# Patient Record
Sex: MALE | Race: WHITE | NOT HISPANIC OR LATINO | URBAN - METROPOLITAN AREA
[De-identification: names, ages, dates, MRNs, and addresses within clinical notes are randomized per-mention and may not be internally consistent; named-entity substitution may affect disease eponyms.]

---

## 2017-05-02 ENCOUNTER — OFFICE VISIT - HEALTHEAST (OUTPATIENT)
Dept: FAMILY MEDICINE | Facility: CLINIC | Age: 27
End: 2017-05-02

## 2017-05-02 DIAGNOSIS — Z00.00 WELL ADULT EXAM: ICD-10-CM

## 2017-05-02 DIAGNOSIS — M20.22 HALLUX RIGIDUS OF LEFT FOOT: ICD-10-CM

## 2017-05-02 LAB
CHOLEST SERPL-MCNC: 151 MG/DL
FASTING STATUS PATIENT QL REPORTED: YES
HDLC SERPL-MCNC: 51 MG/DL
LDLC SERPL CALC-MCNC: 85 MG/DL
TRIGL SERPL-MCNC: 73 MG/DL

## 2017-05-02 ASSESSMENT — MIFFLIN-ST. JEOR: SCORE: 1646.19

## 2017-06-01 ENCOUNTER — OFFICE VISIT - HEALTHEAST (OUTPATIENT)
Dept: PODIATRY | Facility: CLINIC | Age: 27
End: 2017-06-01

## 2017-06-01 DIAGNOSIS — M20.5X2 HALLUX LIMITUS OF LEFT FOOT: ICD-10-CM

## 2017-10-09 ENCOUNTER — OFFICE VISIT - HEALTHEAST (OUTPATIENT)
Dept: FAMILY MEDICINE | Facility: CLINIC | Age: 27
End: 2017-10-09

## 2017-10-09 DIAGNOSIS — Z23 NEED FOR VACCINATION: ICD-10-CM

## 2017-10-09 DIAGNOSIS — M65.4 DE QUERVAIN'S DISEASE (TENOSYNOVITIS): ICD-10-CM

## 2017-10-09 ASSESSMENT — MIFFLIN-ST. JEOR: SCORE: 1675.72

## 2018-12-11 ENCOUNTER — OFFICE VISIT - HEALTHEAST (OUTPATIENT)
Dept: FAMILY MEDICINE | Facility: CLINIC | Age: 28
End: 2018-12-11

## 2018-12-11 DIAGNOSIS — E66.3 OVERWEIGHT: ICD-10-CM

## 2018-12-11 DIAGNOSIS — Z13.1 ENCOUNTER FOR SCREENING FOR DIABETES MELLITUS: ICD-10-CM

## 2018-12-11 DIAGNOSIS — F43.21 ADJUSTMENT DISORDER WITH DEPRESSED MOOD: ICD-10-CM

## 2018-12-11 DIAGNOSIS — Z13.220 ENCOUNTER FOR SCREENING FOR LIPOID DISORDERS: ICD-10-CM

## 2018-12-11 DIAGNOSIS — Z00.00 ENCOUNTER FOR GENERAL ADULT MEDICAL EXAMINATION WITHOUT ABNORMAL FINDINGS: ICD-10-CM

## 2018-12-11 DIAGNOSIS — Z23 NEED FOR VACCINATION: ICD-10-CM

## 2018-12-11 ASSESSMENT — MIFFLIN-ST. JEOR: SCORE: 1668.07

## 2018-12-18 ENCOUNTER — AMBULATORY - HEALTHEAST (OUTPATIENT)
Dept: LAB | Facility: CLINIC | Age: 28
End: 2018-12-18

## 2018-12-18 DIAGNOSIS — Z13.220 ENCOUNTER FOR SCREENING FOR LIPOID DISORDERS: ICD-10-CM

## 2018-12-18 DIAGNOSIS — Z13.1 ENCOUNTER FOR SCREENING FOR DIABETES MELLITUS: ICD-10-CM

## 2018-12-18 DIAGNOSIS — F43.21 ADJUSTMENT DISORDER WITH DEPRESSED MOOD: ICD-10-CM

## 2018-12-18 LAB
CHOLEST SERPL-MCNC: 162 MG/DL
FASTING STATUS PATIENT QL REPORTED: YES
FASTING STATUS PATIENT QL REPORTED: YES
GLUCOSE BLD-MCNC: 91 MG/DL (ref 70–99)
HDLC SERPL-MCNC: 57 MG/DL
LDLC SERPL CALC-MCNC: 89 MG/DL
TRIGL SERPL-MCNC: 81 MG/DL

## 2018-12-19 LAB
25(OH)D3 SERPL-MCNC: 27.8 NG/ML (ref 30–80)
25(OH)D3 SERPL-MCNC: 27.8 NG/ML (ref 30–80)

## 2018-12-21 LAB
SHBG SERPL-SCNC: 41 NMOL/L (ref 11–80)
TESTOST FREE SERPL-MCNC: 10.12 NG/DL (ref 4.7–24.4)
TESTOST SERPL-MCNC: 545 NG/DL (ref 240–950)

## 2019-06-28 ENCOUNTER — COMMUNICATION - HEALTHEAST (OUTPATIENT)
Dept: FAMILY MEDICINE | Facility: CLINIC | Age: 29
End: 2019-06-28

## 2019-07-03 ENCOUNTER — OFFICE VISIT - HEALTHEAST (OUTPATIENT)
Dept: FAMILY MEDICINE | Facility: CLINIC | Age: 29
End: 2019-07-03

## 2019-07-03 ENCOUNTER — RECORDS - HEALTHEAST (OUTPATIENT)
Dept: GENERAL RADIOLOGY | Facility: CLINIC | Age: 29
End: 2019-07-03

## 2019-07-03 DIAGNOSIS — M25.542 JOINT PAIN IN FINGERS OF BOTH HANDS: ICD-10-CM

## 2019-07-03 DIAGNOSIS — M25.541 PAIN IN JOINTS OF RIGHT HAND: ICD-10-CM

## 2019-07-03 DIAGNOSIS — M25.542 PAIN IN JOINTS OF LEFT HAND: ICD-10-CM

## 2019-07-03 DIAGNOSIS — M25.541 JOINT PAIN IN FINGERS OF BOTH HANDS: ICD-10-CM

## 2019-07-03 LAB
C REACTIVE PROTEIN LHE: <0.1 MG/DL (ref 0–0.8)
ERYTHROCYTE [SEDIMENTATION RATE] IN BLOOD BY WESTERGREN METHOD: 2 MM/HR (ref 0–15)
RHEUMATOID FACT SERPL-ACNC: <15 IU/ML (ref 0–30)

## 2019-07-03 ASSESSMENT — MIFFLIN-ST. JEOR: SCORE: 1649.31

## 2019-07-05 LAB — ANA SER QL: 0.1 U

## 2020-09-16 ENCOUNTER — OFFICE VISIT - HEALTHEAST (OUTPATIENT)
Dept: FAMILY MEDICINE | Facility: CLINIC | Age: 30
End: 2020-09-16

## 2020-09-16 DIAGNOSIS — Z13.220 SCREENING CHOLESTEROL LEVEL: ICD-10-CM

## 2020-09-16 DIAGNOSIS — Z00.00 ENCOUNTER FOR GENERAL ADULT MEDICAL EXAMINATION WITHOUT ABNORMAL FINDINGS: ICD-10-CM

## 2020-09-16 DIAGNOSIS — Z13.1 SCREENING FOR DIABETES MELLITUS: ICD-10-CM

## 2020-09-16 LAB
ANION GAP SERPL CALCULATED.3IONS-SCNC: 8 MMOL/L (ref 5–18)
BUN SERPL-MCNC: 18 MG/DL (ref 8–22)
CALCIUM SERPL-MCNC: 9.4 MG/DL (ref 8.5–10.5)
CHLORIDE BLD-SCNC: 106 MMOL/L (ref 98–107)
CHOLEST SERPL-MCNC: 153 MG/DL
CO2 SERPL-SCNC: 26 MMOL/L (ref 22–31)
CREAT SERPL-MCNC: 0.79 MG/DL (ref 0.7–1.3)
FASTING STATUS PATIENT QL REPORTED: YES
GFR SERPL CREATININE-BSD FRML MDRD: >60 ML/MIN/1.73M2
GLUCOSE BLD-MCNC: 91 MG/DL (ref 70–125)
HDLC SERPL-MCNC: 57 MG/DL
LDLC SERPL CALC-MCNC: 82 MG/DL
POTASSIUM BLD-SCNC: 4 MMOL/L (ref 3.5–5)
SODIUM SERPL-SCNC: 140 MMOL/L (ref 136–145)
TRIGL SERPL-MCNC: 70 MG/DL

## 2020-09-16 RX ORDER — MAGNESIUM CITRATE
SOLUTION, ORAL ORAL
Status: SHIPPED | COMMUNITY
Start: 2020-09-16

## 2020-09-16 ASSESSMENT — PATIENT HEALTH QUESTIONNAIRE - PHQ9: SUM OF ALL RESPONSES TO PHQ QUESTIONS 1-9: 2

## 2020-09-16 ASSESSMENT — MIFFLIN-ST. JEOR: SCORE: 1650.35

## 2020-10-23 ENCOUNTER — RECORDS - HEALTHEAST (OUTPATIENT)
Dept: ADMINISTRATIVE | Facility: OTHER | Age: 30
End: 2020-10-23

## 2020-10-23 ENCOUNTER — RECORDS - HEALTHEAST (OUTPATIENT)
Dept: LAB | Facility: CLINIC | Age: 30
End: 2020-10-23

## 2020-10-23 LAB
C REACTIVE PROTEIN LHE: <0.1 MG/DL (ref 0–0.8)
ERYTHROCYTE [DISTWIDTH] IN BLOOD BY AUTOMATED COUNT: 13.2 % (ref 11–14.5)
ERYTHROCYTE [SEDIMENTATION RATE] IN BLOOD BY WESTERGREN METHOD: 2 MM/HR (ref 0–15)
HCT VFR BLD AUTO: 39.3 % (ref 40–54)
HGB BLD-MCNC: 13.5 G/DL (ref 14–18)
MCH RBC QN AUTO: 31.8 PG (ref 27–34)
MCHC RBC AUTO-ENTMCNC: 34.4 G/DL (ref 32–36)
MCV RBC AUTO: 93 FL (ref 80–100)
PLATELET # BLD AUTO: 214 THOU/UL (ref 140–440)
PMV BLD AUTO: 10.8 FL (ref 8.5–12.5)
RBC # BLD AUTO: 4.25 MILL/UL (ref 4.4–6.2)
WBC: 7 THOU/UL (ref 4–11)

## 2020-10-26 LAB — LYME TOTAL ANTIBODY - HISTORICAL: 0.03 INDEX VALUE

## 2020-10-29 LAB
B BURGDOR DNA SPEC QL NAA+PROBE: NOT DETECTED
SPECIMEN SOURCE: NORMAL

## 2020-11-02 ENCOUNTER — OFFICE VISIT - HEALTHEAST (OUTPATIENT)
Dept: RHEUMATOLOGY | Facility: CLINIC | Age: 30
End: 2020-11-02

## 2020-11-02 ENCOUNTER — COMMUNICATION - HEALTHEAST (OUTPATIENT)
Dept: RHEUMATOLOGY | Facility: CLINIC | Age: 30
End: 2020-11-02

## 2020-11-02 DIAGNOSIS — M25.50 POLYARTHRALGIA: ICD-10-CM

## 2020-11-02 RX ORDER — ACETAMINOPHEN 500 MG
500 TABLET ORAL EVERY 6 HOURS PRN
Status: SHIPPED | COMMUNITY
Start: 2020-11-02

## 2020-11-03 ENCOUNTER — AMBULATORY - HEALTHEAST (OUTPATIENT)
Dept: LAB | Facility: CLINIC | Age: 30
End: 2020-11-03

## 2020-11-03 DIAGNOSIS — M25.50 POLYARTHRALGIA: ICD-10-CM

## 2020-11-03 LAB
CCP AB SER IA-ACNC: <0.5 U/ML
URATE SERPL-MCNC: 5 MG/DL (ref 3–8)

## 2020-11-04 LAB — HCV AB SERPL QL IA: NEGATIVE

## 2020-11-05 LAB
B19V IGG SER IA-ACNC: 6.58 IV
B19V IGM SER IA-ACNC: 0.08 IV

## 2020-11-06 ENCOUNTER — OFFICE VISIT - HEALTHEAST (OUTPATIENT)
Dept: RHEUMATOLOGY | Facility: CLINIC | Age: 30
End: 2020-11-06

## 2020-11-06 DIAGNOSIS — M25.50 POLYARTHRALGIA: ICD-10-CM

## 2020-11-16 ENCOUNTER — COMMUNICATION - HEALTHEAST (OUTPATIENT)
Dept: FAMILY MEDICINE | Facility: CLINIC | Age: 30
End: 2020-11-16

## 2020-11-17 ENCOUNTER — AMBULATORY - HEALTHEAST (OUTPATIENT)
Dept: UROLOGY | Facility: CLINIC | Age: 30
End: 2020-11-17

## 2020-11-17 ENCOUNTER — AMBULATORY - HEALTHEAST (OUTPATIENT)
Dept: FAMILY MEDICINE | Facility: CLINIC | Age: 30
End: 2020-11-17

## 2020-11-17 DIAGNOSIS — N20.0 KIDNEY STONE: ICD-10-CM

## 2020-11-17 DIAGNOSIS — N20.1 CALCULUS OF URETER: ICD-10-CM

## 2020-11-19 ENCOUNTER — AMBULATORY - HEALTHEAST (OUTPATIENT)
Dept: LAB | Facility: HOSPITAL | Age: 30
End: 2020-11-19

## 2020-11-19 DIAGNOSIS — N20.1 CALCULUS OF URETER: ICD-10-CM

## 2020-11-22 LAB
APPEARANCE STONE: NORMAL
COMPN STONE: NORMAL
NUMBER STONE: 1
SIZE STONE: NORMAL MM
SPECIMEN WT: 7 MG

## 2020-11-25 ENCOUNTER — OFFICE VISIT - HEALTHEAST (OUTPATIENT)
Dept: UROLOGY | Facility: CLINIC | Age: 30
End: 2020-11-25

## 2020-11-25 DIAGNOSIS — N21.8 CALCULUS OF OTHER LOWER URINARY TRACT LOCATION: ICD-10-CM

## 2020-12-08 ENCOUNTER — COMMUNICATION - HEALTHEAST (OUTPATIENT)
Dept: UROLOGY | Facility: CLINIC | Age: 30
End: 2020-12-08

## 2020-12-09 ENCOUNTER — RECORDS - HEALTHEAST (OUTPATIENT)
Dept: ADMINISTRATIVE | Facility: OTHER | Age: 30
End: 2020-12-09

## 2020-12-22 ENCOUNTER — COMMUNICATION - HEALTHEAST (OUTPATIENT)
Dept: UROLOGY | Facility: CLINIC | Age: 30
End: 2020-12-22

## 2020-12-28 ENCOUNTER — HOSPITAL ENCOUNTER (OUTPATIENT)
Dept: CT IMAGING | Facility: HOSPITAL | Age: 30
Discharge: HOME | End: 2020-12-28
Attending: PHYSICIAN ASSISTANT

## 2020-12-28 DIAGNOSIS — N21.8 CALCULUS OF OTHER LOWER URINARY TRACT LOCATION: ICD-10-CM

## 2020-12-29 ENCOUNTER — OFFICE VISIT - HEALTHEAST (OUTPATIENT)
Dept: UROLOGY | Facility: CLINIC | Age: 30
End: 2020-12-29

## 2020-12-29 DIAGNOSIS — R82.992 HYPEROXALURIA: ICD-10-CM

## 2020-12-29 DIAGNOSIS — R82.991 HYPOCITRATURIA: ICD-10-CM

## 2020-12-29 DIAGNOSIS — R82.994 HYPERCALCIURIA: ICD-10-CM

## 2020-12-29 DIAGNOSIS — N20.0 CALCULUS OF KIDNEY: ICD-10-CM

## 2020-12-29 DIAGNOSIS — R82.998 HIGH URINE SODIUM: ICD-10-CM

## 2020-12-29 DIAGNOSIS — Z84.1 FAMILY HISTORY OF KIDNEY STONES: ICD-10-CM

## 2021-05-27 VITALS — HEART RATE: 68 BPM | SYSTOLIC BLOOD PRESSURE: 110 MMHG | DIASTOLIC BLOOD PRESSURE: 64 MMHG

## 2021-05-27 ASSESSMENT — PATIENT HEALTH QUESTIONNAIRE - PHQ9: SUM OF ALL RESPONSES TO PHQ QUESTIONS 1-9: 2

## 2021-05-30 VITALS — BODY MASS INDEX: 25.35 KG/M2 | WEIGHT: 161.5 LBS | HEIGHT: 67 IN

## 2021-05-30 NOTE — PROGRESS NOTES
Assessment / Impression     1. Joint pain in fingers of both hands  C-Reactive Protein    Antinuclear Antibody (ABHINAV) Cascade    Rheumatoid Factor Quant    Erythrocyte Sedimentation Rate    XR Hands Bilateral 2 VWS         Plan:     I recommended that we check x-rays of the hands today.  I personally reviewed the images with him and provided an initial interpretation.  No fractures or dislocation seen.  The joint spaces appear intact and well preserved.  Formal radiology report is pending.  I recommended that we check the above labs and he will be notified of the results when they are available.  If the lab results are negative I explained that his symptoms are likely due to overuse and suggested rest.  He may take Tylenol or ibuprofen as needed.    Subjective:      HPI: Kehinde Muhammad is a 28 y.o. male who presents to the clinic complaining of joint pains in both hands that he has had over the past month.  Triggers include gripping and lifting his young son.  He does a lot of of keyboarding and mouse work for his job as a .  He denies any visual swelling in the joints, but his angers feel full at times.  Symptoms are worse in the third and fourth fingers at the MCP and PIP joints.  He denies any specific injuries.  He denies having history of fractures or dislocations that involve the fingers.  He reports that his father started having hand arthritis symptoms when he was in his 30s.        Review of Systems  All other systems reviewed and are negative.     Social History     Tobacco Use   Smoking Status Never Smoker   Smokeless Tobacco Never Used       Family History   Problem Relation Age of Onset     Obesity Mother      Depression Mother      Diabetes Father      Obesity Father      Arthritis Father      Depression Sister        Objective:     /70 (Patient Site: Left Arm, Patient Position: Sitting, Cuff Size: Adult Regular)   Pulse (!) 52   Temp 98.6  F (37  C) (Oral)   Resp 16   Ht 5'  "7\" (1.702 m)   Wt 162 lb 3 oz (73.6 kg)   BMI 25.40 kg/m    Physical Examination: General appearance - alert, well appearing, and in no distress  Neurological: alert, oriented, normal speech, no focal findings or movement disorder noted.    Extremities: No edema, no clubbing or cyanosis.  Hands/fingers appear symmetric and not inflamed.  No bony tenderness throughout the hands and fingers.  He is able to flex and extend without difficulty.   strength is normal bilaterally.  Radial and ulnar pulses 2 out of 4 bilaterally.      No results found for this or any previous visit (from the past 168 hour(s)).    No current outpatient medications on file.     "

## 2021-05-31 VITALS — HEIGHT: 67 IN | WEIGHT: 169.06 LBS | BODY MASS INDEX: 26.53 KG/M2

## 2021-06-02 VITALS — BODY MASS INDEX: 26.27 KG/M2 | WEIGHT: 167.38 LBS | HEIGHT: 67 IN

## 2021-06-03 VITALS — BODY MASS INDEX: 25.46 KG/M2 | WEIGHT: 162.19 LBS | HEIGHT: 67 IN

## 2021-06-05 VITALS
HEART RATE: 56 BPM | WEIGHT: 161.7 LBS | DIASTOLIC BLOOD PRESSURE: 68 MMHG | RESPIRATION RATE: 14 BRPM | HEIGHT: 67 IN | BODY MASS INDEX: 25.38 KG/M2 | SYSTOLIC BLOOD PRESSURE: 120 MMHG

## 2021-06-10 NOTE — PROGRESS NOTES
Assessment:      Healthy male exam.    1. Well adult exam  Lipid Cascade    Glucose   2. Hallux rigidus of left foot  Ambulatory referral to Podiatry     The following high BMI interventions were performed this visit: encouragement to exercise     Plan:      Encouraged him to eat a healthy diet and get regular exercise.  We are going to check the above fasting labs and he will be notified of the results when they are available.  He was given a referral to podiatry to have the left great toe evaluated.     All questions answered.  Discussed healthy lifestyle modifications.  Follow up as needed.     Subjective:      Kehinde Muhammad is a 26 y.o. male who presents for an annual exam. The patient reports that there is not domestic violence in his life.  He denies having concerns regarding hearing, vision, urination, bowel movements, sleep or mood.  He describes having a history of left-sided hallux rigidus and underwent surgery in 2013.  He reports having worsening pain and some decreased range of motion in the left great toe.  His symptoms are worse with overuse.  He denies swelling or erythema.    Social history: , his wife is pregnant with her first child and due in July.  He works as a .    Healthy Habits:   Regular Exercise: Yes  Sunscreen Use: Yes  Healthy Diet: Yes  Dental Visits Regularly: Yes  Seat Belt: Yes  Sexually active: Yes  Monthly Self Testicular Exams:  No  Hemoccults: No  Flex Sig: No  Colonoscopy: No  Lipid Profile: Yes 2015  Glucose Screen: Yes 2015  Prevention of Osteoporosis: No  Last Dexa: N/A  Guns at Home:  N/A      Immunization History   Administered Date(s) Administered     Influenza, inj, historic 10/03/2013, 10/15/2015     Tdap 11/11/2015     Immunization status: up to date and documented.    No exam data present    No current outpatient prescriptions on file.     No current facility-administered medications for this visit.      No past medical history on file.  No  "past surgical history on file.  Review of patient's allergies indicates no known allergies.  Family History   Problem Relation Age of Onset     Obesity Mother      Depression Mother      Diabetes Father      Obesity Father      Depression Sister      Social History     Social History     Marital status: Single     Spouse name: N/A     Number of children: N/A     Years of education: N/A     Occupational History     Not on file.     Social History Main Topics     Smoking status: Never Smoker     Smokeless tobacco: Never Used     Alcohol use Yes      Comment: 2-4 drinks a week.     Drug use: No     Sexual activity: Not on file     Other Topics Concern     Not on file     Social History Narrative       Review of Systems  General:  Denies problem  Eyes: Denies problem  Ears/Nose/Throat: Denies problem  Cardiovascular: Denies problem  Respiratory:  Denies problem  Gastrointestinal:  Denies problem  Genitourinary: Denies problem  Musculoskeletal:  Denies problem  Skin: Denies problem  Neurologic: Denies problem  Psychiatric: Denies problem  Endocrine: Denies problem  Heme/Lymphatic: Denies problem   Allergic/Immunologic: Denies problem        Objective:     Vitals:    05/02/17 0805   BP: 110/66   Pulse: (!) 56   Resp: 16   Temp: 98.4  F (36.9  C)   TempSrc: Oral   Weight: 161 lb 8 oz (73.3 kg)   Height: 5' 7\" (1.702 m)     Body mass index is 25.29 kg/(m^2).    Physical  General Appearance: Alert, cooperative, no distress, appears stated age  Head: Normocephalic, without obvious abnormality, atraumatic  Eyes: PERRL, conjunctiva/corneas clear, EOM's intact  Ears: Normal TM's and external ear canals, both ears  Nose: Nares normal, septum midline,mucosa normal, no drainage  Throat: Lips, mucosa, and tongue normal; teeth and gums normal  Neck: Supple, symmetrical, trachea midline, no adenopathy;  thyroid: not enlarged, symmetric, no tenderness/mass/nodules  Back: Symmetric, no curvature, ROM normal, no CVA tenderness  Lungs: " Clear to auscultation bilaterally, respirations unlabored  Heart: Regular rate and rhythm, S1 and S2 normal, no murmur, rub, or gallop,  Abdomen: Soft, non-tender, bowel sounds active all four quadrants,  no masses, no organomegaly  Genitourinary: Penis normal. Right testis is descended. Left testis is descended.  no hernias palpated  Musculoskeletal: Normal range of motion. No joint swelling or deformity.   Extremities: Extremities normal, atraumatic, no cyanosis or edema.  Passive range of motion in the left great toe is good.  Extension of the great toe causes discomfort.  There is no bony tenderness in the feet.  Skin: Skin color, texture, turgor normal, no rashes or lesions  Lymph nodes: Cervical, supraclavicular, and axillary nodes normal  Neurologic: He is alert. He has normal reflexes.   Psychiatric: He has a normal mood and affect.

## 2021-06-11 NOTE — PROGRESS NOTES
Admission History & Physical  Kehinde Muhammad, 1990, [id number]    [facility] Bello Taylor DO, [phone]    Extended Emergency Contact Information  Primary Emergency Contact: Melissa Muhammad  Address: [address]           [place] [address] [place]  Home Phone: [phone]  Mobile Phone: [phone]  Relation: Spouse     Assessment and Plan:   Assessment: Hallux limitus left foot  Plan: I recommended ice and elevation and rest when he has episodes of pain.  I do not recommend any surgery at this time.  Active Problems:    * No active hospital problems. *      Chief Complaint:  painful first metatarsal phalangeal joint left foot      HPI:    Kehinde Muhammad is a 26 y.o. old male who presented to the clinic today complaining of pain involving the big toe joint left foot.  The patient stated that 4 years ago he had a cheilectomy performed.  He is able to remain active but he does have some moderate pain after increased activity.  He has since switched from running to cycling.  He has an aching pain in the big toe joint left foot.  He does not have any associated redness or swelling with his symptoms.  The pain is relieved with nonweightbearing and rest.  History is provided by patient    Medical History  Active Ambulatory (Non-Hospital) Problems    Diagnosis     Overweight     Joint Pain, Localized In The Shoulder     History reviewed. No pertinent past medical history.  Patient Active Problem List    Diagnosis Date Noted     Overweight      Joint Pain, Localized In The Shoulder      Surgical History  He  has no past surgical history on file.   History reviewed. No pertinent surgical history. Social History  Reviewed, and he  reports that he has never smoked. He has never used smokeless tobacco. He reports that he drinks alcohol. He reports that he does not use illicit drugs.  Social History   Substance Use Topics     Smoking status: Never Smoker      Smokeless tobacco: Never Used     Alcohol use Yes      Comment: 2-4 drinks a week.      Allergies  No Known Allergies Family History  Reviewed, and family history includes Depression in his mother and sister; Diabetes in his father; Obesity in his father and mother.   Psychosocial Needs  Social History     Social History Narrative     Additional psychosocial needs reviewed per nursing assessment.       Prior to Admission Medications     (Not in a hospital admission)        Review of Systems - Negative     /58  Pulse 60  SpO2 98%    Objective findings: General: The patient is alert and in no acute distress      Integument: Nails bilateral feet are normal length and color. Skin bilateral feet warm and intact.  There is a well-healed surgical incision on the dorsal aspect of the first metatarsal phalangeal joint left foot.      Vascular: DP and PT pulses +2/4 bilateral feet.  Capillary refill less than 2 seconds bilateral feet.      Neurologic: Negative clonus, negative Babinski bilateral feet.       Musculoskeletal: Range of motion within normal limits bilaterally.  Muscle power +5/5 bilaterally in all compartments.  There is a large firm subcutaneous mass on the dorsal aspect of the head of the first metatarsal bilateral feet.      There is good range of motion at the first MPJ bilaterally when the great toe is maximally dorsiflexed.  There is a slight decrease in the amount of plantarflexion available at the first MPJ left foot.  There is no crepitus on range of motion of the first MPJ left foot.  There is no pain on range of motion of the first MPJ left foot.        Assessment: Stage I hallux limitus left foot        Plan: An x-ray of the left foot was taken today.  I informed the patient that the joint is in fairly good condition at this time.  I do not recommend any aggressive treatment.  I certainly do not recommend any additional surgery.  The first metatarsophalangeal joint is in a rectus position and  there are very mild degenerative changes at the joint.  I recommended the patient continue to remain active.  If he has periods of pain and swelling I recommended anti-inflammatory medication rest and ice.  In the future he will more than likely have to have this surgically repaired and I would probably recommend at that time a first metatarsal phalangeal joint implant arthroplasty.

## 2021-06-12 NOTE — PROGRESS NOTES
ASSESSMENT AND PLAN:  Kehinde Muhammad 30 y.o. male is seen here on 11/06/20 for follow-up of recent evaluation for polyarthralgias, with insufficient evidence to rule in or rule out an inflammatory joint disease, we will get together here should there be recurrence, will take pictures in case of swollen joint.  Parvo IgG antibodies were positive, IgM negative.  There are no serologic signals of autoimmunity.     Diagnoses and all orders for this visit:    Polyarthralgia      HISTORY OF PRESENTING ILLNESS:  Kehinde Muhammad, 30 y.o., male is here for follow-up of recent evaluation of polyarthralgias.  This is first troubled him 12 to 18 months ago.  This affects his digits, this is bilateral, typically worse in the morning and in the afternoon, he has not observed swelling.  He has taken Tylenol which has helped some but he wonder s if tart cherry products have helped him more.  He was originally seen in his primary physician's office.  Work-up was negative for rheumatoid factor, ABHINAV Lyme serology, had x-rays of the hands.  No definitive diagnosis was arrived at.  His symptoms improved spontaneously.  But the summer 2020 there was recurrence.  Similar involvement.  This time he was seen in hand specialist office at [facility] repeat x-rays and repeat blood test, pain at that time was 7-8/10, again inconclusive, suggested rheumatology evaluation, meanwhile he went back to the tar cherry juice, that is he feels helped his pain is down to 2/10.  Once again no swelling redness.  He has noted no rash such as psoriasis ulcerative colitis Crohn's disease himself or the family.  He has noted no other joint areas are painful including wrists elbows shoulders knees hips ankles or toes.  There has been no suggestion of dactylitis.  There is no history of iritis.  He describes himself otherwise in good health.  He is a 2-year-old boy, and toddler.  They have not had symptoms suggestive of fifth disease.  There  is no family history of rheumatoid arthritis or lupus. ALLERGIES:Patient has no known allergies.    PAST MEDICAL/ACTIVE PROBLEMS/MEDICATION/ FAMILY HISTORY/SOCIAL DATA:  The patient has a family history of  History reviewed. No pertinent past medical history.  Social History     Tobacco Use   Smoking Status Never Smoker   Smokeless Tobacco Never Used     Patient Active Problem List   Diagnosis     Overweight     Current Outpatient Medications   Medication Sig Dispense Refill     acetaminophen (TYLENOL) 500 MG tablet Take 500 mg by mouth every 6 (six) hours as needed for pain.       cholecalciferol, vitamin D3, (VITAMIN D3 ORAL) 5,000 mg.       magnesium citrate solution        multivit with min-folic acid (MEN'S MULTIVITAMIN GUMMIES) 200 mcg Chew        No current facility-administered medications for this visit.      DETAILED EXAMINATION  11/06/20  :  Vitals:    11/06/20 1035   BP: 110/64   Patient Site: Right Arm   Patient Position: Sitting   Cuff Size: Adult Regular   Pulse: 68     Alert oriented. Head including the face is examined for malar rash, heliotropes, scarring, lupus pernio. Eyes examined for redness such as in episcleritis/scleritis, periorbital lesions.   Neck/ Face examined for parotid gland swelling, range of motion of neck.  Left upper and lower and right upper and lower extremities examined for tenderness, swelling, warmth of the appendicular joints, range of motion, edema, rash.  Some of the important findings included: he does not have synovitis in any of the palpable joints of upper extremities, there is no dactylitis, there is no triggering of the digits.      LAB / IMAGING DATA:  Creatinine   Date Value Ref Range Status   09/16/2020 0.79 0.70 - 1.30 mg/dL Final   11/11/2015 0.80 0.70 - 1.30 mg/dL Final       WBC   Date Value Ref Range Status   10/23/2020 7.0 4.0 - 11.0 thou/uL Final     Hemoglobin   Date Value Ref Range Status   10/23/2020 13.5 (L) 14.0 - 18.0 g/dL Final     Platelets   Date  Value Ref Range Status   10/23/2020 214 140 - 440 thou/uL Final       Lab Results   Component Value Date    RF <15.0 07/03/2019    SEDRATE 2 10/23/2020

## 2021-06-12 NOTE — PROGRESS NOTES
"Kehinde Muhammad is a 30 y.o. male who is being evaluated via a billable video visit.      The patient has been notified of following:     \"This video visit will be conducted via a call between you and your physician/provider. We have found that certain health care needs can be provided without the need for an in-person physical exam.  This service lets us provide the care you need with a video conversation.  If a prescription is necessary we can send it directly to your pharmacy.  If lab work is needed we can place an order for that and you can then stop by our lab to have the test done at a later time.    Video visits are billed at different rates depending on your insurance coverage. Please reach out to your insurance provider with any questions.    If during the course of the call the physician/provider feels a video visit is not appropriate, you will not be charged for this service.\"    Patient has given verbal consent to a Video visit? Yes  How would you like to obtain your AVS? AVS Preference: [employer].  If dropped by the video visit, the video invitation should be sent to: Text to cell phone: [employer]   Will anyone else be joining your video visit? No        Video-Visit Details    Type of service:  Video Visit    Originating Location (pt. Location): Home    Distant Location (provider location):  [facility]     Platform used for Video Visit: [employer]    Start: 11/02/2020 09:44 am   Stop: 11/02/2020 09:56 am    ASSESSMENT AND PLAN:  Kehinde Muhammad 30 y.o. male is seen here on 11/02/20 for evaluation of polyarthralgias intermittent over the past 12 to 18 months, predominantly affecting PIPs and MCPs of both hands, negative ABHINAV rheumatoid factor, acute phase response negative, recurrence recently, will look into this further work-up as noted will get together in the next near future further course to be charted accordingly.  Diagnoses and all orders for this visit:    Polyarthralgia  -     " CCP Antibodies; Future; Expected date: 11/04/2020  -     Hepatitis C Antibody (Anti-HCV); Future; Expected date: 11/04/2020  -     Uric Acid; Future; Expected date: 11/04/2020  -     Parvovirus B19 Antibodies, IgG and IgM; Future; Expected date: 11/04/2020      HISTORY OF PRESENTING ILLNESS:  Kehinde Muhammad, 30 y.o., male is here for evaluation of polyarthralgias.  This is first troubled him 12 to 18 months ago.  This affects his digits, this is bilateral, typically worse in the morning and in the afternoon, he has not observed swelling.  He has taken Tylenol which has helped some but he wonder s if tart cherry products have helped him more.  He was originally seen in his primary physician's office.  Work-up was negative for rheumatoid factor, ABHINAV Lyme serology, had x-rays of the hands.  No definitive diagnosis was arrived at.  His symptoms improved spontaneously.  But the summer 2020 there was recurrence.  Similar involvement.  This time he was seen in hand specialist office at [facility] repeat x-rays and repeat blood test, pain at that time was 7-8/10, again inconclusive, suggested rheumatology evaluation, meanwhile he went back to the tar cherry juice, that is he feels helped his pain is down to 2/10.  Once again no swelling redness.  He has noted no rash such as psoriasis ulcerative colitis Crohn's disease himself or the family.  He has noted no other joint areas are painful including wrists elbows shoulders knees hips ankles or toes.  There has been no suggestion of dactylitis.  There is no history of iritis.  He describes himself otherwise in good health.  He is a 2-year-old boy, and toddler.  They have not had symptoms suggestive of fifth disease.  There is no family history of rheumatoid arthritis or lupus.    Further historical information and ADL limitations as noted in the multidimensional health assessment questionnaire attached in the EMR. Rest of the 13 system ROS is negative.      ALLERGIES:Patient has no known allergies.    PAST MEDICAL/ACTIVE PROBLEMS/MEDICATION/ FAMILY HISTORY/SOCIAL DATA:  The patient has a family history of  No past medical history on file.  Social History     Tobacco Use   Smoking Status Never Smoker   Smokeless Tobacco Never Used     Patient Active Problem List   Diagnosis     Overweight     Current Outpatient Medications   Medication Sig Dispense Refill     acetaminophen (TYLENOL) 500 MG tablet Take 500 mg by mouth every 6 (six) hours as needed for pain.       cholecalciferol, vitamin D3, (VITAMIN D3 ORAL) 5,000 mg.       magnesium citrate solution        multivit with min-folic acid (MEN'S MULTIVITAMIN GUMMIES) 200 mcg Chew        No current facility-administered medications for this visit.        COMPREHENSIVE EXAMINATION:  There were no vitals filed for this visit.  A well appearing alert oriented male.   Well appearing alert oriented.   Examination of the eyes revealed no redness, obvious scleromalacia.  ENT examination shows no nasal deformity such as of saddle type, external ear without signs of inflamm/deformity ation.  Cardiopulmonary examination without obvious signs of dyspnea, no wheezing is audible, no cyanosis.  There is no finger clubbing.  Skin examination performed for heliotrope, malar area eruption periungual erythema, lupus pernio.  Neurological examination shows the speech is fluent, no facial asymmetry, muscle power in the upper extremities proximally appears to be normal.  In the psychiatric examination the memory, orientation, attention, affect were observable and normal.  Joint examination of the DIPs, PIPs, MCPs, IP joints of the thumbs, wrists, elbows, for swelling, range of motion, for shoulders range of motion evaluated.  The salient  findings are: There is no swelling, dactylitis of the digits, he is able to fully flex them into fist abduction of the shoulder is normal elbow wrist range of motion is normal no Maller area  eruption.    LAB / IMAGING DATA:  Creatinine   Date Value Ref Range Status   09/16/2020 0.79 0.70 - 1.30 mg/dL Final   11/11/2015 0.80 0.70 - 1.30 mg/dL Final       WBC   Date Value Ref Range Status   10/23/2020 7.0 4.0 - 11.0 thou/uL Final     Hemoglobin   Date Value Ref Range Status   10/23/2020 13.5 (L) 14.0 - 18.0 g/dL Final     Platelets   Date Value Ref Range Status   10/23/2020 214 140 - 440 thou/uL Final       Lab Results   Component Value Date    RF <15.0 07/03/2019    SEDRATE 2 10/23/2020

## 2021-06-12 NOTE — TELEPHONE ENCOUNTER
Per Dr. Steele,   pls schedule this pt on this coming Fri 11/6/2020 @ 0945 as OV. Notified scheduling pool team thru IM as well.   Thank you     Jaylene Pride CMA [facility] 11/2/2020 11:32 AM

## 2021-06-12 NOTE — PROGRESS NOTES
1st attempt   LVMT[facility]- to go through rooming questions. Will try again later     Jaylene Pride CMA [facility] 11/2/2020 8:16 AM

## 2021-06-13 NOTE — PROGRESS NOTES
"Assessment / Impression     1. De Quervain's disease (tenosynovitis)  Splint, Wrist/Hand/Finger w/Thumb, Premier   2. Need for vaccination  Influenza, Seasonal,Quad Inj, 36+ MOS         Plan:     I explained that his symptoms are consistent with de Quervain's tenosynovitis on the right wrist.  We discussed activity modifications to help with this overuse injury.  He was given a thumb spica splint and a handout that includes exercises he can do at home.  I demonstrated an eccentric exercise that may provide benefit as well.  He may take Tylenol or ibuprofen as needed.  The symptoms do not improve he may return to the clinic and we can discuss alternative treatment options which may include a corticosteroid injection.    Subjective:      HPI: Kehinde Muhammad is a 27 y.o. male who presents to the clinic complaining of pain with stiffness and some swelling in the right wrist proximal to the thumb over the past 10 days.  He denies any known injury.  He has a 3-month-old baby boy at home.  Lifting him makes his wrist symptoms worse.        Review of Systems  All other systems reviewed and are negative.     History   Smoking Status     Never Smoker   Smokeless Tobacco     Never Used       Family History   Problem Relation Age of Onset     Obesity Mother      Depression Mother      Diabetes Father      Obesity Father      Depression Sister        Objective:     /72 (Patient Site: Left Arm, Patient Position: Sitting, Cuff Size: Adult Regular)  Pulse 68  Temp 98.6  F (37  C) (Oral)   Resp 16  Ht 5' 6.7\" (1.694 m)  Wt 169 lb 1 oz (76.7 kg)  BMI 26.72 kg/m2  Physical Examination: General appearance - alert, well appearing, and in no distress  Neurological: alert, oriented, normal speech, no focal findings or movement disorder noted.    Extremities: No edema, bruising, clubbing or cyanosis.  He is tender over the soft tissue in the right lateral wrist, proximal to the thumb.  Finkelstein's test is positive on the " right.  Radial and ulnar pulses 2 out of 4 bilaterally.  There is no bony tenderness in his hand, wrist or elbow.    No results found for this or any previous visit (from the past 168 hour(s)).    No current outpatient prescriptions on file.

## 2021-06-13 NOTE — PROGRESS NOTES
"Assessment/Plan:        Diagnoses and all orders for this visit:    Calculus of other lower urinary tract location  -     Patient Stated Goal: Prevent further stones  -     24 Hour Urine Collection Steps Education  -     CT Abdomen Pelvis Without Oral Without IV Contrast; Future; Expected date: 12/09/2020    Other orders  -     sour cherry extract (TART CHERRY EXTRACT) 1,000 mg cap      Stone Management Plan  [facility] Stone Management 11/25/2020   Urinary Tract Infection No suspicion of infection   Renal Colic Asymptomatic at this time   Renal Failure No suspicion of renal failure           Phone call duration: 8 minutes    Lisa Alba PA-C    Subjective:      The patient has been notified of following:     \"This telephone visit will be conducted via a call between you and your physician/provider. We have found that certain health care needs can be provided without the need for a physical exam.  This service lets us provide the care you need with a short phone conversation. If a prescription is necessary, we can send it directly to your pharmacy.  If labs and/or imaging are needed, we can place orders so that you can have the test (s) done at a later time.    If during the course of the call the physician/provider feels a telephone visit is not appropriate, you will not be charged for this service.\"     HPI  Mr. Kehined Muhammad is a 30 y.o.  male who is being evaluated via a billable telephone visit by [facility] [place] referred by PCP for recent stone passage event.    He is a first time calcium oxalate stone former who has not required stone clearance procedures. He has not previously participated in stone risk evaluation. He has no identified modifiable stone risk factors. He has identified non-modifiable stone risks including:  limited family history.    Approximately ~ 1 week ago, he awoke with urethral pain and within a couple hours, passed a 2-3 mm stone. He called his PCP, who had " the stone analyzed and sent referral to our office. He has been asymptomatic since passing the stone. He denies symptoms of fever, chills, flank pain, nausea, vomiting, urinary frequency and dysuria.     No imaging available.    Significant labs from presentation include uric acid 5. Stone analysis from 11/19/20 is primarily CaOx.    PLAN    31 yo M first time stone former with recent passage of a CaOx stone. Familial history of stone disease.    Will initiate comprehensive stone risk evaluation. Two 24 hour urine collections and dietary journal will be obtained at earliest covenience. Patient verbalized understanding. Patient agrees with plan as discussed. He will return to clinic when labs are available with a baseline CT abdomen/pelvis to evaluate if any residual stone burden.         ROS   A 12 point comprehensive review of systems is negative except for HPI    No past medical history on file.    Past Surgical History:   Procedure Laterality Date     left foot surg Left        Current Outpatient Medications   Medication Sig Dispense Refill     acetaminophen (TYLENOL) 500 MG tablet Take 500 mg by mouth every 6 (six) hours as needed for pain.       cholecalciferol, vitamin D3, (VITAMIN D3 ORAL) 5,000 mg.       magnesium citrate solution        multivit with min-folic acid (MEN'S MULTIVITAMIN GUMMIES) 200 mcg Chew        sour cherry extract (TART CHERRY EXTRACT) 1,000 mg cap        No current facility-administered medications for this visit.        No Known Allergies    Social History     Socioeconomic History     Marital status:      Spouse name: Not on file     Number of children: Not on file     Years of education: Not on file     Highest education level: Not on file   Occupational History     Not on file   Social Needs     Financial resource strain: Not on file     Food insecurity     Worry: Not on file     Inability: Not on file     Transportation needs     Medical: Not on file     Non-medical: Not on  file   Tobacco Use     Smoking status: Never Smoker     Smokeless tobacco: Never Used   Substance and Sexual Activity     Alcohol use: Yes     Frequency: 2-3 times a week     Comment: 2-4 drinks a week.     Drug use: No     Sexual activity: Not on file   Lifestyle     Physical activity     Days per week: Not on file     Minutes per session: Not on file     Stress: Not on file   Relationships     Social connections     Talks on phone: Not on file     Gets together: Not on file     Attends Latter day service: Not on file     Active member of club or organization: Not on file     Attends meetings of clubs or organizations: Not on file     Relationship status: Not on file     Intimate partner violence     Fear of current or ex partner: Not on file     Emotionally abused: Not on file     Physically abused: Not on file     Forced sexual activity: Not on file   Other Topics Concern     Not on file   Social History Narrative     Not on file       Family History   Problem Relation Age of Onset     Obesity Mother      Depression Mother      Diabetes Father      Obesity Father      Arthritis Father      Depression Sister        Objective:      Physical Exam  There were no vitals filed for this visit.    Labs    Acute Labs   Renal Panel  [facility]  Creatinine   Date Value Ref Range Status   09/16/2020 0.79 0.70 - 1.30 mg/dL Final   11/11/2015 0.80 0.70 - 1.30 mg/dL Final     Potassium   Date Value Ref Range Status   09/16/2020 4.0 3.5 - 5.0 mmol/L Final   11/11/2015 3.9 3.5 - 5.0 mmol/L Final     Calcium   Date Value Ref Range Status   09/16/2020 9.4 8.5 - 10.5 mg/dL Final   11/11/2015 9.8 8.5 - 10.5 mg/dL Final

## 2021-06-13 NOTE — PATIENT INSTRUCTIONS - HE
Patient Stated Goal: Prevent further stones  Steps for collecting a 24 hour urine specimen    Please follow the directions carefully. All urine voided for a 24-hour period needs to be collected into the jug.  DO NOT change any of your  normal  daily habits when doing this test. Continue to follow your regular diet, intake of fluids, and usual activity level. Pick the most convenient day with your schedule, perhaps on a weekend or a day off.    Start your Diet Log the day before collection and continue on the day of urine collection.  You MUST bring Diet Log with you on follow up visit to discuss results.    One 24hr Urine Collection     Two 24hr Urine Collections  (do not collect on consecutive days)    PLEASE COMPLETE THE 2nd JUG WITHIN 1-2 WEEKS FROM THE 1st JUG    STEP 1  Empty your bladder completely into the toilet. This will be your start time. Write your full legal name, start date and time on the jug label.  Collection start and stop times need to match exactly!  For example:  6 am to 6 am.    STEP 2  The next time you urinate, empty your bladder directly into the jug or collection hat and pour urine into the jug.  Screw the lid back onto the jug.  Do not spill!    STEP 3  Place the jug in the refrigerator or a cooler with ice during the collection period.  Failure to keep it cool could cause inaccurate test results. DO NOT Freeze.    STEP 4  Continue collecting all urine into the jug for the rest of the day, for the full 24 hours.  DO NOT stop early or go over 24 hours!    STEP 5  Exactly 24 hours from start of collection, write your full legal name, stop date and time on the jug label.   Collection start and stop times need to match exactly!  For example:  6 am to 6 am.  Failure to label correctly will result in recollection of urine specimen.    STEP 6  Return each jug within 24 hours after final urination.     STEP 7  Drop off jug locations:   [facility]: Mon-Fri 7am-7pm - Closed on  weekends  [facility]: Mon-Fri 7am-5pm - Closed on Sunday  [facility]: Mon-Fri 7am-6:30pm - Closed on weekends    STEP 8  Please call [facility] after return of your final jug to schedule your follow-up visit. [phone]

## 2021-06-14 NOTE — PROGRESS NOTES
"Assessment/Plan:        Diagnoses and all orders for this visit:    High urine sodium  -     Patient Stated Goal: Prevent further stones    Family history of kidney stones  -     Patient Stated Goal: Prevent further stones    Calculus of kidney  -     Patient Stated Goal: Prevent further stones    Hypercalciuria  -     Patient Stated Goal: Prevent further stones    Hyperoxaluria  -     Patient Stated Goal: Prevent further stones    Hypocitraturia  -     Patient Stated Goal: Prevent further stones  24-hour urine Litholink study 3 months     Stone Management Plan  [facility] Stone Management 11/25/2020 12/29/2020   Urinary Tract Infection No suspicion of infection No suspicion of infection   Renal Colic Asymptomatic at this time Asymptomatic at this time   Renal Failure No suspicion of renal failure No suspicion of renal failure   R Stone Event - No current event   L Stone Event - No current event             Phone call duration: 13 minutes    Yazan Retana MD     Subjective:      The patient has been notified of following:     \"This telephone visit will be conducted via a call between you and your physician/provider. We have found that certain health care needs can be provided without the need for a physical exam.  This service lets us provide the care you need with a short phone conversation.  If a prescription is necessary we can send it directly to your pharmacy.  If labs and/or imaging are needed, we can place orders so you can have the test (s) done at a later time.    If during the course of the call the physician/provider feels a telephone visit is not appropriate, you will not be charged for this service.\"     HPI  Mr. Kehinde Muhammad is a 30 y.o.  male who is being evaluated via a billable telephone visit by [facility] [place] for follow-up stone risk studies having passed stone spontaneously for the first time at age 30.  Stone analysis was 100% calcium oxalate.  Family " history is positive in several siblings and his parents.    Patient currently is without symptoms of abdominal or flank pain or voiding symptoms of urgency or frequency.    Stone risk studies included normal serum calcium 9.4, creatinine 0.79 uric acid 5.0.  24-hour urines showed low volume 1400 500 cc in 2100 cc.  Sodium elevated at 170 and 135 calcium elevated at 295 and 322 citrate low at 311 and 353.  Oxalate elevated at 50 on 1 occasion normal on the other.    Impression known bilateral stones 1 mm right 2 mm left, risk factors low volume hypercalciuria with elevated urinary sodium hypocitraturia solitary elevated urine oxalate    Plan;   increase urine output to 80 ounces per 24 hours minimum sodium restriction 2500 mg/day monitor high oxalate containing foods and reduce intake citrate rich fluids such as lemonade  Follow-up 24-hour urine Litholink study 3 months.  Patient advised stones that he has are very passable but could cause colic.  Would recommend a CT scan sometime after 12/2021.    ROS     Review of systems is negative except for HPI.    No past medical history on file.    Past Surgical History:   Procedure Laterality Date     left foot surg Left         Current Outpatient Medications   Medication Sig Dispense Refill     cholecalciferol, vitamin D3, (VITAMIN D3 ORAL) 5,000 mg.       magnesium citrate solution        multivit with min-folic acid (MEN'S MULTIVITAMIN GUMMIES) 200 mcg Chew        sour cherry extract (TART CHERRY EXTRACT) 1,000 mg cap        acetaminophen (TYLENOL) 500 MG tablet Take 500 mg by mouth every 6 (six) hours as needed for pain.       No current facility-administered medications for this visit.        No Known Allergies    Social History     Socioeconomic History     Marital status:      Spouse name: Not on file     Number of children: Not on file     Years of education: Not on file     Highest education level: Not on file   Occupational History     Not on file   Social  Needs     Financial resource strain: Not on file     Food insecurity     Worry: Not on file     Inability: Not on file     Transportation needs     Medical: Not on file     Non-medical: Not on file   Tobacco Use     Smoking status: Never Smoker     Smokeless tobacco: Never Used   Substance and Sexual Activity     Alcohol use: Yes     Frequency: 2-3 times a week     Comment: 2-4 drinks a week.     Drug use: No     Sexual activity: Not on file   Lifestyle     Physical activity     Days per week: Not on file     Minutes per session: Not on file     Stress: Not on file   Relationships     Social connections     Talks on phone: Not on file     Gets together: Not on file     Attends Restorationist service: Not on file     Active member of club or organization: Not on file     Attends meetings of clubs or organizations: Not on file     Relationship status: Not on file     Intimate partner violence     Fear of current or ex partner: Not on file     Emotionally abused: Not on file     Physically abused: Not on file     Forced sexual activity: Not on file   Other Topics Concern     Not on file   Social History Narrative     Not on file       Family History   Problem Relation Age of Onset     Obesity Mother      Depression Mother      Diabetes Father      Obesity Father      Arthritis Father      Depression Sister        Objective:      Labs   Stone prevention labs   Serum chemistries   Creatinine   Date Value Ref Range Status   09/16/2020 0.79 0.70 - 1.30 mg/dL Final   11/11/2015 0.80 0.70 - 1.30 mg/dL Final     Potassium   Date Value Ref Range Status   09/16/2020 4.0 3.5 - 5.0 mmol/L Final   11/11/2015 3.9 3.5 - 5.0 mmol/L Final     Calcium   Date Value Ref Range Status   09/16/2020 9.4 8.5 - 10.5 mg/dL Final   11/11/2015 9.8 8.5 - 10.5 mg/dL Final     Uric Acid   Date Value Ref Range Status   11/03/2020 5.0 3.0 - 8.0 mg/dL Final   , Calcium metabolism No results found for: CAIONMEAS   No results found for: PTH  Vitamin D, Total  (25-Hydroxy)   Date Value Ref Range Status   12/18/2018 27.8 (L) 30.0 - 80.0 ng/mL Final     and 24 hour urine No results found for: [id number], CALCIUMUR, OC61QOW, [id number], COKZB84N, LABPH, LABURIN

## 2021-06-16 PROBLEM — M25.50 POLYARTHRALGIA: Chronic | Status: ACTIVE | Noted: 2020-11-06

## 2021-06-16 PROBLEM — Z84.1 FAMILY HISTORY OF KIDNEY STONES: Status: ACTIVE | Noted: 2020-12-29

## 2021-06-22 NOTE — PROGRESS NOTES
MALE PREVENTATIVE EXAM    Assessment and Plan:       1. Encounter for general adult medical examination without abnormal findings  He appears to be in good health.  I recommended he work on getting more regular exercise and eat a healthy diet.  He is going to return to the clinic for fasting labs.    2. Encounter for screening for diabetes mellitus  - Glucose- FASTING FUTURE; Future    3. Encounter for screening for lipoid disorders  - Lipid Cascade- FASTING  FUTURE; Future    4. Adjustment disorder with depressed mood  His depressed mood symptoms are likely multifactorial as he is not getting much time for himself, regular exercise or adequate sleep as he and his wife are raising their young child.  We discussed strategies to make some lifestyle modifications that may help with this.  He may benefit from counseling if this.  He will let me know if he would like a formal referral.  He is interested in having testosterone levels and vitamin D level checked as part of the workup.  - Testosterone, Free and Total; Future  - Vitamin D, Total (25-Hydroxy); Future    5. Need for vaccination  - Influenza, Seasonal,Quad Inj, 36+ MOS     Next follow up:  No Follow-up on file.    Immunization Review  Adult Imm Review: Due today, orders placed      I discussed the following with the patient:   Adult Healthy Living: Importance of regular exercise  Healthy nutrition  Stress management        Subjective:   Chief Complaint: Kehinde Muhammad is an 28 y.o. male here for a preventative health visit.     HPI: He denies concerns regarding hearing, vision, urination or bowel movements.  He admits that he is not getting as much sleep as he used to since his [id number] son was born.  He also admits that his mood is been a little depressed over the past year and a half.  He attributes this to not getting as much sleep, exercise or time for himself.  He is in the process of looking for new job opportunities.  On 5/2/17 his fasting glucose  "was 96, LDL 85.  The family medical history significant for diabetes in his father.    Social history: , 18-month-old son.  He works as a  at [employer].    Healthy Habits  Are you taking a daily aspirin? No  Do you typically exercising at least 40 min, 3-4 times per week?  NO  Do you usually eat at least 4 servings of fruit and vegetables a day, include whole grains and fiber and avoid regularly eating high fat foods? NO  Have you had an eye exam in the past two years? Yes  Do you see a dentist twice per year? Yes  Do you have any concerns regarding sleep? No    Safety Screen  If you own firearms, are they secured in a locked gun cabinet or with trigger locks? The patient does not own any firearms  Do you feel you are safe where you are living?: Yes (12/11/2018  3:45 PM)  Do you feel you are safe in your relationship(s)?: Yes (12/11/2018  3:45 PM)      Review of Systems:  Please see above.  The rest of the review of systems are negative for all systems.     Cancer Screening     Patient has no health maintenance due at this time              History     Reviewed By Date/Time Sections Reviewed    Taylor Marroquin CMA 12/11/2018  3:45 PM Tobacco    Taylor Marroquin CMA 12/11/2018  3:44 PM Tobacco            Objective:   Vital Signs:   Visit Vitals  /74 (Patient Site: Left Arm, Patient Position: Sitting, Cuff Size: Adult Regular)   Pulse 60   Temp 97.9  F (36.6  C) (Oral)   Resp 16   Ht 5' 6.7\" (1.694 m)   Wt 167 lb 6 oz (75.9 kg)   BMI 26.45 kg/m           PHYSICAL EXAM  General Appearance: Alert, cooperative, no distress, appears stated age  Head: Normocephalic, without obvious abnormality, atraumatic  Eyes: PERRL, conjunctiva/corneas clear, EOM's intact  Ears: Normal TM's and external ear canals, both ears  Nose: Nares normal, septum midline,mucosa normal, no drainage  Throat: Lips, mucosa, and tongue normal; teeth and gums normal  Neck: Supple, symmetrical, trachea " midline, no adenopathy;  thyroid: not enlarged, symmetric, no tenderness/mass/nodules  Back: Symmetric, no curvature, ROM normal, no CVA tenderness  Lungs: Clear to auscultation bilaterally, respirations unlabored  Heart: Regular rate and rhythm, S1 and S2 normal, no murmur, rub, or gallop,  Abdomen: Soft, non-tender, bowel sounds active all four quadrants,  no masses, no organomegaly  Musculoskeletal: Normal range of motion. No joint swelling or deformity.   Extremities: Extremities normal, atraumatic, no cyanosis or edema  Skin: Skin color, texture, turgor normal, no rashes or lesions  Lymph nodes: Cervical, supraclavicular, and axillary nodes normal  Neurologic: He is alert.  Normal speech.  No focal deficits.  Normal deep tendon reflexes.   Psychiatric: Mood appears mildly depressed.  He answers questions appropriately.  He does not appear anxious.              Medication List      as of 12/11/2018  4:13 PM     You have not been prescribed any medications.         Additional Screenings Completed Today:

## 2021-06-22 NOTE — PROGRESS NOTES
The patient was counseled and encouraged to consider modifying their diet and eating habits. He was provided with information on recommended healthy diet options.  The patient was counseled and encouraged to consider modifying their diet and eating habits. He was provided with information on recommended healthy diet options.

## 2021-06-26 ENCOUNTER — HEALTH MAINTENANCE LETTER (OUTPATIENT)
Age: 31
End: 2021-06-26

## 2021-06-29 NOTE — PROGRESS NOTES
Progress Notes by Luke Rivera DO at 9/16/2020  8:20 AM     Author: Luke Rivera DO Service: -- Author Type: Physician    Filed: 9/16/2020 12:39 PM Encounter Date: 9/16/2020 Status: Signed    : Luke Rivera DO (Physician)       MALE PREVENTATIVE EXAM    Assessment and Plan:       1. Encounter for general adult medical examination without abnormal findings  I encouraged him to keep eating healthy foods and getting regular exercise.  We are going to check fasting labs today.    2. Screening cholesterol level  - Lipid Cascade    3. Screening for diabetes mellitus  - Basic Metabolic Panel     Next follow up:  No follow-ups on file.    Immunization Review  Adult Imm Review: Due today, orders placed      I discussed the following with the patient:   Adult Healthy Living: Importance of regular exercise  Healthy nutrition        Subjective:   Chief Complaint: Kehinde Muhammad is an 30 y.o. male here for a preventative health visit.  {  HPI: He denies concerns regarding hearing, vision, urination, bowel movements, sleep or mood.  He has been seeing a therapist to help with anxiety symptoms.  He feels like this has been beneficial.    Social history: , 3-year-old son.  He and his wife are expecting a daughter in about 6 weeks.  He works as a  for [employer].    Healthy Habits  Are you taking a daily aspirin? No  Do you typically exercising at least 40 min, 3-4 times per week?  Yes  Do you usually eat at least 4 servings of fruit and vegetables a day, include whole grains and fiber and avoid regularly eating high fat foods? Yes  Have you had an eye exam in the past two years? About 3 yrs.  Do you see a dentist twice per year? Yes  Do you have any concerns regarding sleep? No    Safety Screen  If you own firearms, are they secured in a locked gun cabinet or with trigger locks? The patient does not own any firearms  Do you feel you are safe  "where you are living?: Yes (9/16/2020  8:18 AM)  Do you feel you are safe in your relationship(s)?: Yes (9/16/2020  8:18 AM)      Review of Systems:  Please see above.  The rest of the review of systems are negative for all systems.     Cancer Screening     Patient has no health maintenance due at this time              History     Reviewed By Date/Time Sections Reviewed    Ruma Herrera CMA 9/16/2020  8:25 AM Tobacco    Ruma Herrera CMA 9/16/2020  8:18 AM Tobacco            Objective:   Vital Signs:   Visit Vitals  /68 (Patient Site: Right Arm, Patient Position: Sitting, Cuff Size: Adult Regular)   Pulse (!) 56   Resp 14   Ht 5' 7.21\" (1.707 m)   Wt 161 lb 11.2 oz (73.3 kg)   BMI 25.17 kg/m           PHYSICAL EXAM  General Appearance: Alert, cooperative, no distress, appears stated age  Head: Normocephalic, without obvious abnormality, atraumatic  Eyes: PERRL, conjunctiva/corneas clear, EOM's intact  Ears: Normal TM's and external ear canals, both ears  Nose: Nares normal, septum midline,mucosa normal, no drainage  Throat: Lips, mucosa, and tongue normal; teeth and gums normal  Neck: Supple, symmetrical, trachea midline, no adenopathy;  thyroid: not enlarged, symmetric, no tenderness/mass/nodules  Back: Symmetric, no curvature, ROM normal, no CVA tenderness  Lungs: Clear to auscultation bilaterally, respirations unlabored  Heart: Regular rate and rhythm, S1 and S2 normal, no murmur, rub, or gallop,  Abdomen: Soft, non-tender, bowel sounds active all four quadrants,  no masses, no organomegaly  Musculoskeletal: Normal range of motion. No joint swelling or deformity.   Extremities: Extremities normal, atraumatic, no cyanosis or edema  Skin: Skin color, texture, turgor normal, no rashes.  There is a skin tag in the middle of his back which is not inflamed or tender.  Lymph nodes: Cervical, supraclavicular, and axillary nodes normal  Neurologic: He is alert.  Normal speech.  No focal deficits.  Normal " deep tendon reflexes.   Psychiatric: He has a normal mood and affect.              Medication List          Accurate as of September 16, 2020  8:55 AM. If you have any questions, ask your nurse or doctor.            CONTINUE taking these medications    magnesium citrate solution        Men's Multivitamin Gummies 200 mcg Chew  Generic drug:  multivit with min-folic acid        VITAMIN D3 ORAL  INSTRUCTIONS:  5,000 mg.               Additional Screenings Completed Today:

## 2021-10-16 ENCOUNTER — HEALTH MAINTENANCE LETTER (OUTPATIENT)
Age: 31
End: 2021-10-16

## 2022-09-25 ENCOUNTER — HEALTH MAINTENANCE LETTER (OUTPATIENT)
Age: 32
End: 2022-09-25

## 2023-06-16 ENCOUNTER — LAB REQUISITION (OUTPATIENT)
Dept: LAB | Facility: CLINIC | Age: 33
End: 2023-06-16
Payer: COMMERCIAL

## 2023-06-16 DIAGNOSIS — M67.431 GANGLION, RIGHT WRIST: ICD-10-CM

## 2023-06-16 PROCEDURE — 88305 TISSUE EXAM BY PATHOLOGIST: CPT | Mod: TC,ORL | Performed by: SURGERY

## 2023-06-23 LAB
PATH REPORT.COMMENTS IMP SPEC: NORMAL
PATH REPORT.COMMENTS IMP SPEC: NORMAL
PATH REPORT.FINAL DX SPEC: NORMAL
PATH REPORT.GROSS SPEC: NORMAL
PATH REPORT.MICROSCOPIC SPEC OTHER STN: NORMAL
PATH REPORT.RELEVANT HX SPEC: NORMAL
PHOTO IMAGE: NORMAL

## 2023-06-23 PROCEDURE — 88304 TISSUE EXAM BY PATHOLOGIST: CPT | Mod: 26 | Performed by: PATHOLOGY

## 2023-12-23 ENCOUNTER — HEALTH MAINTENANCE LETTER (OUTPATIENT)
Age: 33
End: 2023-12-23

## 2024-10-30 DIAGNOSIS — E83.119 HEMOCHROMATOSIS: Primary | ICD-10-CM
